# Patient Record
Sex: FEMALE | Race: WHITE | NOT HISPANIC OR LATINO | URBAN - NONMETROPOLITAN AREA
[De-identification: names, ages, dates, MRNs, and addresses within clinical notes are randomized per-mention and may not be internally consistent; named-entity substitution may affect disease eponyms.]

---

## 2025-02-18 ENCOUNTER — APPOINTMENT (OUTPATIENT)
Dept: PRIMARY CARE | Facility: CLINIC | Age: 45
End: 2025-02-18

## 2025-02-18 VITALS
TEMPERATURE: 97.5 F | DIASTOLIC BLOOD PRESSURE: 74 MMHG | WEIGHT: 145.2 LBS | BODY MASS INDEX: 28.51 KG/M2 | HEART RATE: 91 BPM | SYSTOLIC BLOOD PRESSURE: 102 MMHG | OXYGEN SATURATION: 98 % | HEIGHT: 60 IN

## 2025-02-18 DIAGNOSIS — R00.2 PALPITATIONS: ICD-10-CM

## 2025-02-18 DIAGNOSIS — E53.8 B12 DEFICIENCY: ICD-10-CM

## 2025-02-18 DIAGNOSIS — Z12.31 SCREENING MAMMOGRAM FOR BREAST CANCER: ICD-10-CM

## 2025-02-18 DIAGNOSIS — E78.5 BORDERLINE HYPERLIPIDEMIA: ICD-10-CM

## 2025-02-18 DIAGNOSIS — Z13.6 SCREENING FOR HEART DISEASE: Primary | ICD-10-CM

## 2025-02-18 DIAGNOSIS — E55.9 VITAMIN D INSUFFICIENCY: ICD-10-CM

## 2025-02-18 PROBLEM — F32.5 MAJOR DEPRESSIVE DISORDER WITH SINGLE EPISODE, IN FULL REMISSION (CMS-HCC): Status: ACTIVE | Noted: 2025-02-18

## 2025-02-18 PROCEDURE — 99202 OFFICE O/P NEW SF 15 MIN: CPT | Performed by: PHYSICIAN ASSISTANT

## 2025-02-18 PROCEDURE — 1036F TOBACCO NON-USER: CPT | Performed by: PHYSICIAN ASSISTANT

## 2025-02-18 PROCEDURE — 3008F BODY MASS INDEX DOCD: CPT | Performed by: PHYSICIAN ASSISTANT

## 2025-02-18 RX ORDER — SERTRALINE HYDROCHLORIDE 50 MG/1
50 TABLET, FILM COATED ORAL DAILY
COMMUNITY

## 2025-02-18 ASSESSMENT — PATIENT HEALTH QUESTIONNAIRE - PHQ9
1. LITTLE INTEREST OR PLEASURE IN DOING THINGS: NOT AT ALL
SUM OF ALL RESPONSES TO PHQ9 QUESTIONS 1 AND 2: 0
2. FEELING DOWN, DEPRESSED OR HOPELESS: NOT AT ALL

## 2025-02-18 NOTE — PROGRESS NOTES
Subjective     HPI   Alta Milton is a 44 y.o. year old female patient with presenting to clinic with concern for   Chief Complaint   Patient presents with    New Patient Visit     From [place]- moved to [place] in 2024.   Had been in [facility] for several years also.    Depression & anxiety    -Sertraline 50    No heart burn or headaches. No chest pain    Hx palpitations    SI dysfunction, no ankylosing spondylitis      Supplements  Calcium  Ashwaganda  B complex  Vitamin D3  Perimenopausal herb blend  Ginko  Tumeric  Prenatal vitamin    Palpitations    Family Cardiac hx  Mom age 57 double ag 57  Maternal aunt 47  Maternal uncle CVA age 62   Maternal grandmother age 60 triple bypass  Great aunt MI-  mid 50s  Maternal grandfather several MIs    Hx heavy EtOH use prior to     Patient Active Problem List   Diagnosis    Major depressive disorder with single episode, in full remission (CMS-[facility])       History reviewed. No pertinent past medical history.   Past Surgical History:   Procedure Laterality Date    D&C FIRST TRIMESTER / TX INCOMPLETE / MISSED / SEPTIC / INDUCED        x's2    LYMPH NODE BIOPSY        Family History   Problem Relation Name Age of Onset    Breast cancer Mother      Heart disease Maternal Grandmother      Heart attack Maternal Grandmother      Heart disease Maternal Grandfather      Heart attack Maternal Grandfather      Hypertension Other        Social History     Tobacco Use    Smoking status: Never    Smokeless tobacco: Never   Substance Use Topics    Alcohol use: Yes     Comment: social        Current Outpatient Medications:     sertraline (Zoloft) 50 mg tablet, Take 1 tablet (50 mg) by mouth once daily. Takes in the evening, Disp: , Rfl:      Review of Systems  Constitutional: Denies fever  HEENT: Denies ST, earache  CVS: Denies Chest pain  Pulmonary: Denies wheezing, SOB  GI: Denies N/V  : Denies dysuria  Musculoskeletal:  Denies myalgia  Neuro: Denies focal  weakness or numbness.  Skin: Denies Rashes.  *Review of Systems is negative unless otherwise mentioned in HPI or ROS above.    Objective   /74   Pulse 91   Temp 36.4 °C (97.5 °F)   Ht 1.524 m (5')   Wt 65.9 kg (145 lb 3.2 oz)   SpO2 98%   BMI 28.36 kg/m²  reviewed Body mass index is 28.36 kg/m².     Physical Exam  Constitutional: NAD.  Resting comfortably.  Head: Atraumatic, normocephalic.  ENT: Moist oral mucosa. Nasal mucosa wnl.   Cardiac: Regular rate & rhythm.   Pulmonary: Lungs clear bilat  GI: Soft, Nontender, nondistended.   Musculoskeletal: No peripheral edema.   Skin: No evidence of trauma. No rashes  Psych: Intact judgement and insight.    .Assessment/Plan   Problem List Items Addressed This Visit    None  Visit Diagnoses         Codes    Screening for heart disease    -  Primary Z13.6    Relevant Orders    CT cardiac scoring wo IV contrast    Referral to Cardiology    Screening mammogram for breast cancer     Z12.31    Relevant Orders    BI mammo bilateral screening tomosynthesis    Borderline hyperlipidemia     E78.5    Relevant Orders    CBC    Comprehensive Metabolic Panel    TSH with reflex to Free T4 if abnormal    Lipid Panel    Vitamin D insufficiency     E55.9    Relevant Orders    Vitamin D 25-Hydroxy,Total (for eval of Vitamin D levels)    B12 deficiency     E53.8    Relevant Orders    Vitamin B12    Palpitations     R00.2    Relevant Orders    Referral to Cardiology

## 2025-03-28 ENCOUNTER — TELEMEDICINE (OUTPATIENT)
Dept: PRIMARY CARE | Facility: CLINIC | Age: 45
End: 2025-03-28

## 2025-03-28 DIAGNOSIS — R11.0 NAUSEA: ICD-10-CM

## 2025-03-28 DIAGNOSIS — J01.00 ACUTE NON-RECURRENT MAXILLARY SINUSITIS: Primary | ICD-10-CM

## 2025-03-28 PROCEDURE — 1036F TOBACCO NON-USER: CPT | Performed by: PHYSICIAN ASSISTANT

## 2025-03-28 PROCEDURE — 99212 OFFICE O/P EST SF 10 MIN: CPT | Performed by: PHYSICIAN ASSISTANT

## 2025-03-28 RX ORDER — AZITHROMYCIN 250 MG/1
TABLET, FILM COATED ORAL
Qty: 6 TABLET | Refills: 0 | Status: SHIPPED | OUTPATIENT
Start: 2025-03-28 | End: 2025-04-02

## 2025-03-28 RX ORDER — ONDANSETRON 4 MG/1
4 TABLET, FILM COATED ORAL EVERY 8 HOURS PRN
Qty: 10 TABLET | Refills: 0 | Status: SHIPPED | OUTPATIENT
Start: 2025-03-28

## 2025-03-28 NOTE — LETTER
March 28, 2025     Alta Cochrann  [address]  [facility] [address]    Patient: Alta Milton   YOB: 1980   Date of Visit: 3/28/2025     To Whom It May Concern:    Alta Milton  was seen by my clinic on 3/28/25. Please excuse Alta Milton for her absence Saturday, Sunday and Monday 3/29-3/31 due to medical need. She may return to work without restriction on 4/1/25.    If you have any questions or concerns, please don't hesitate to call.    Sincerely,     Vickie Hurt PA-C, [facility]  [address] [place] [id number]    Phone [phone] ext.[phone]  Fax [phone]

## 2025-03-28 NOTE — PROGRESS NOTES
An interactive audio and video telecommunication system which permits real time communications between the patient (at the originating site) and provider (at the distant site) was utilized to provide this telehealth service.   Verbal consent was requested and obtained from Alta Milton on this date, 25 for a telehealth visit and the patient's location was confirmed at the time of the visit.    Subjective    Alta Milton is a 44 y.o. year old female patient presenting for virtual visit   Chief Complaint   Patient presents with    Cough      Patient presents to with a 5 day history of fatigue, followed the next day my malaise and myalgia with fever, headache, dizziness, cough, sore throat, headache, rhinorrhea with myalgias and fatigue. Endorses subjective fevers with chills. No vomiting or change in bowel habits.      Pt is  eating and drinking sufficiently without change of taste or smell.  Normal urinary output.  No rash.  No shortness of breath or dyspnea on exertion. Moderately productive cough. Primary symptoms are cough and ST with malaise and bodyaches and fever.      No known COVID exposure. No known influenza exposure. No known strep exposure. All are currently circulating in the community.  Home testing      Patient Active Problem List   Diagnosis    Major depressive disorder with single episode, in full remission (CMS-[facility])       History reviewed. No pertinent past medical history.   Past Surgical History:   Procedure Laterality Date    D&C FIRST TRIMESTER / TX INCOMPLETE / MISSED / SEPTIC / INDUCED        x's2    LYMPH NODE BIOPSY        Family History   Problem Relation Name Age of Onset    Breast cancer Mother      Heart disease Maternal Grandmother      Heart attack Maternal Grandmother      Heart disease Maternal Grandfather      Heart attack Maternal Grandfather      Hypertension Other        Social History     Tobacco Use    Smoking status: Never    Smokeless tobacco: Never    Substance Use Topics    Alcohol use: Yes     Comment: social        Current Outpatient Medications:     azithromycin (Zithromax) 250 mg tablet, Take 2 tablets (500 mg) by mouth once daily for 1 day, THEN 1 tablet (250 mg) once daily for 4 days. Take 2 tabs (500 mg) by mouth today, than 1 daily for 4 days.., Disp: 6 tablet, Rfl: 0    ondansetron (Zofran) 4 mg tablet, Take 1 tablet (4 mg) by mouth every 8 hours if needed for nausea or vomiting., Disp: 10 tablet, Rfl: 0    sertraline (Zoloft) 50 mg tablet, Take 1 tablet (50 mg) by mouth once daily. Takes in the evening, Disp: , Rfl:      Review of Systems    Review of Systems:   Constitutional: Denies fever  HEENT: Denies ST, earache  CVS: Denies Chest pain  Pulmonary: Denies wheezing, SOB  GI: Denies N/V  : Denies dysuria  Musculoskeletal:  Denies myalgia  Neuro: Denies focal weakness or numbness.  Skin: Denies Rashes.  *Review of Systems is negative unless otherwise mentioned in HPI or ROS above.     Objective    VITALS  Pt does not have vitals available at time of visit.    Exam       Limited physical exam in virtual platform  Nontoxic. No acute distress.  Nonlabored breathing.    Assessment/Plan      Problem List Items Addressed This Visit    None  Visit Diagnoses       Acute non-recurrent maxillary sinusitis    -  Primary    Relevant Medications    azithromycin (Zithromax) 250 mg tablet    Nausea        Relevant Medications    ondansetron (Zofran) 4 mg tablet

## 2025-04-10 DIAGNOSIS — F32.5 MAJOR DEPRESSIVE DISORDER WITH SINGLE EPISODE, IN FULL REMISSION (CMS-HCC): ICD-10-CM

## 2025-04-10 RX ORDER — SERTRALINE HYDROCHLORIDE 50 MG/1
50 TABLET, FILM COATED ORAL DAILY
Qty: 90 TABLET | Refills: 1 | Status: SHIPPED | OUTPATIENT
Start: 2025-04-10

## 2025-07-29 ENCOUNTER — OFFICE VISIT (OUTPATIENT)
Dept: PRIMARY CARE | Facility: CLINIC | Age: 45
End: 2025-07-29

## 2025-07-29 VITALS
HEART RATE: 76 BPM | HEIGHT: 60 IN | OXYGEN SATURATION: 98 % | WEIGHT: 126.8 LBS | BODY MASS INDEX: 24.9 KG/M2 | TEMPERATURE: 97.6 F | DIASTOLIC BLOOD PRESSURE: 60 MMHG | SYSTOLIC BLOOD PRESSURE: 98 MMHG

## 2025-07-29 DIAGNOSIS — A69.20 ERYTHEMA MIGRANS (LYME DISEASE): Primary | ICD-10-CM

## 2025-07-29 PROCEDURE — 99213 OFFICE O/P EST LOW 20 MIN: CPT | Performed by: PHYSICIAN ASSISTANT

## 2025-07-29 PROCEDURE — 3008F BODY MASS INDEX DOCD: CPT | Performed by: PHYSICIAN ASSISTANT

## 2025-07-29 PROCEDURE — 1036F TOBACCO NON-USER: CPT | Performed by: PHYSICIAN ASSISTANT

## 2025-07-29 RX ORDER — DOXYCYCLINE 100 MG/1
100 CAPSULE ORAL 2 TIMES DAILY
Qty: 20 CAPSULE | Refills: 0 | Status: SHIPPED | OUTPATIENT
Start: 2025-07-29 | End: 2025-08-08

## 2025-07-29 ASSESSMENT — PATIENT HEALTH QUESTIONNAIRE - PHQ9
SUM OF ALL RESPONSES TO PHQ9 QUESTIONS 1 AND 2: 0
1. LITTLE INTEREST OR PLEASURE IN DOING THINGS: NOT AT ALL
2. FEELING DOWN, DEPRESSED OR HOPELESS: NOT AT ALL

## 2025-07-29 NOTE — PROGRESS NOTES
Subjective     HPI   Alta Milton is a 45 y.o. year old female patient with presenting to clinic with concern for   Chief Complaint   Patient presents with    Insect Bite     Tick bite, 7/12/2025, patient was able to remove it, left foot, no symptoms noticed until 7/24/2025       Right foot tick bite 7/12. Was removed easily with tweezers in 1 piece by her. Rash developed 5 days ago. Worsened pain and swelling.       Problem List[1]    Medical History[2]   Surgical History[3]   Family History[4]   Social History     Tobacco Use    Smoking status: Never    Smokeless tobacco: Never   Substance Use Topics    Alcohol use: Yes     Comment: social      Current Medications[5]     Review of Systems  Constitutional: Denies fever  HEENT: Denies ST, earache  CVS: Denies Chest pain  Pulmonary: Denies wheezing, SOB  GI: Denies N/V  : Denies dysuria  Musculoskeletal:  Denies myalgia  Neuro: Denies focal weakness or numbness.  Skin: Denies Rashes.  *Review of Systems is negative unless otherwise mentioned in HPI or ROS above.    Objective   BP 98/60   Pulse 76   Temp 36.4 °C (97.6 °F)   Ht (!) 1.524 m (5')   Wt 57.5 kg (126 lb 12.8 oz)   SpO2 98%   BMI 24.76 kg/m²  reviewed Body mass index is 24.76 kg/m².     Physical Exam  Constitutional: NAD.  Resting comfortably.  Head: Atraumatic, normocephalic.  ENT: Moist oral mucosa. Nasal mucosa wnl.   Cardiac: Regular rate & rhythm.   Pulmonary: Lungs clear bilat  GI: Soft, Nontender, nondistended.   Musculoskeletal: No peripheral edema.   Skin: No evidence of trauma. Left foot  erythematuos rash with warmth and central clearing.   Psych: Intact judgement and insight.    .Assessment/Plan   Problem List Items Addressed This Visit    None  Visit Diagnoses         Codes      Erythema migrans (Lyme disease)    -  Primary A69.20                 [1]   Patient Active Problem List  Diagnosis    Major depressive disorder with single episode, in full remission   [2] History reviewed. No  pertinent past medical history.  [3]   Past Surgical History:  Procedure Laterality Date    D&C FIRST TRIMESTER / TX INCOMPLETE / MISSED / SEPTIC / INDUCED        x's2    LYMPH NODE BIOPSY     [4]   Family History  Problem Relation Name Age of Onset    Breast cancer Mother      Heart disease Maternal Grandmother      Heart attack Maternal Grandmother      Heart disease Maternal Grandfather      Heart attack Maternal Grandfather      Hypertension Other     [5]   Current Outpatient Medications:     ondansetron (Zofran) 4 mg tablet, Take 1 tablet (4 mg) by mouth every 8 hours if needed for nausea or vomiting., Disp: 10 tablet, Rfl: 0    sertraline (Zoloft) 50 mg tablet, Take 1 tablet (50 mg) by mouth once daily. Takes in the evening, Disp: 90 tablet, Rfl: 1

## 2025-08-06 DIAGNOSIS — A69.20 ERYTHEMA MIGRANS (LYME DISEASE): ICD-10-CM

## 2025-08-06 RX ORDER — DOXYCYCLINE 100 MG/1
100 CAPSULE ORAL 2 TIMES DAILY
Qty: 10 CAPSULE | Refills: 0 | Status: SHIPPED | OUTPATIENT
Start: 2025-08-06 | End: 2025-08-11

## 2025-08-19 DIAGNOSIS — Z12.31 ENCOUNTER FOR SCREENING MAMMOGRAM FOR BREAST CANCER: ICD-10-CM

## 2025-08-20 ENCOUNTER — APPOINTMENT (OUTPATIENT)
Dept: PRIMARY CARE | Facility: CLINIC | Age: 45
End: 2025-08-20

## 2025-08-20 VITALS
HEART RATE: 68 BPM | SYSTOLIC BLOOD PRESSURE: 97 MMHG | DIASTOLIC BLOOD PRESSURE: 64 MMHG | BODY MASS INDEX: 25.8 KG/M2 | WEIGHT: 131.4 LBS | HEIGHT: 60 IN | TEMPERATURE: 97.8 F | OXYGEN SATURATION: 98 %

## 2025-08-20 DIAGNOSIS — L03.116 CELLULITIS OF LEFT LOWER EXTREMITY: Primary | ICD-10-CM

## 2025-08-20 PROCEDURE — 1036F TOBACCO NON-USER: CPT | Performed by: PHYSICIAN ASSISTANT

## 2025-08-20 PROCEDURE — 3008F BODY MASS INDEX DOCD: CPT | Performed by: PHYSICIAN ASSISTANT

## 2025-08-20 PROCEDURE — 99212 OFFICE O/P EST SF 10 MIN: CPT | Performed by: PHYSICIAN ASSISTANT
